# Patient Record
Sex: FEMALE | Race: WHITE | NOT HISPANIC OR LATINO | Employment: OTHER | URBAN - METROPOLITAN AREA
[De-identification: names, ages, dates, MRNs, and addresses within clinical notes are randomized per-mention and may not be internally consistent; named-entity substitution may affect disease eponyms.]

---

## 2018-08-06 ENCOUNTER — OFFICE VISIT (OUTPATIENT)
Dept: OTOLARYNGOLOGY | Facility: CLINIC | Age: 53
End: 2018-08-06
Payer: COMMERCIAL

## 2018-08-06 VITALS
SYSTOLIC BLOOD PRESSURE: 138 MMHG | TEMPERATURE: 98 F | HEART RATE: 65 BPM | WEIGHT: 182.13 LBS | DIASTOLIC BLOOD PRESSURE: 84 MMHG

## 2018-08-06 DIAGNOSIS — J32.9 CHRONIC SINUSITIS, UNSPECIFIED LOCATION: Primary | ICD-10-CM

## 2018-08-06 PROCEDURE — 99999 PR PBB SHADOW E&M-NEW PATIENT-LVL III: CPT | Mod: PBBFAC,,, | Performed by: ORTHOPAEDIC SURGERY

## 2018-08-06 PROCEDURE — 99204 OFFICE O/P NEW MOD 45 MIN: CPT | Mod: S$GLB,,, | Performed by: ORTHOPAEDIC SURGERY

## 2018-08-06 RX ORDER — LITHIUM CARBONATE 450 MG/1
450 TABLET ORAL 2 TIMES DAILY
COMMUNITY

## 2018-08-06 RX ORDER — QUETIAPINE FUMARATE 100 MG/1
100 TABLET, FILM COATED ORAL NIGHTLY
COMMUNITY

## 2018-08-06 RX ORDER — MELOXICAM 7.5 MG/1
7.5 TABLET ORAL DAILY PRN
COMMUNITY
Start: 2017-08-10

## 2018-08-06 NOTE — LETTER
August 6, 2018      Yessica Lubin MD  [address]  [facility] [address]           Critical access hospital Otorhinolaryngology  [address] [place] [id number]  Phone: [phone]  Fax: [phone]          Patient: Candice Schofield   MR Number: [id number]   YOB: 1965   Date of Visit: 8/6/2018       Dear Dr. Yessica Lubin:    Thank you for referring Candice Schofield to me for evaluation. Attached you will find relevant portions of my assessment and plan of care.    If you have questions, please do not hesitate to call me. I look forward to following Candice Schofield along with you.    Sincerely,    Jolly Haider MD    Enclosure  CC:  No Recipients    If you would like to receive this communication electronically, please contact [email or web address] or [phone] to request more information on [employer] [employer] access.    For providers and/or their staff who would like to refer a patient to [facility], please contact us through our one-stop-shop provider referral line, [facility], at [phone].    If you feel you have received this communication in error or would no longer like to receive these types of communications, please e-mail [email or web address]

## 2018-08-06 NOTE — LETTER
August 6, 2018        Yessica Chris MD  [address] [facility]  [place] [address]             [facility] Otorhinolaryngology  [address] [place] [id number]  Phone: [phone]  Fax: [phone]   Patient: Candice Schofield   MR Number: [id number]   YOB: 1965   Date of Visit: 8/6/2018       Dear Dr. Chris:    Thank you for referring Candice Schofield to me for evaluation. Attached you will find relevant portions of my assessment and plan of care.    If you have questions, please do not hesitate to call me. I look forward to following Candice Schofield along with you.    Sincerely,      Jolly Haider MD          CC  No Recipients    Enclosure

## 2018-08-06 NOTE — PROGRESS NOTES
Subjective:       Patient ID: Candice Schofield is a 53 y.o. female.    Chief Complaint: Consult and Sinus Problem    Patient is a very pleasant 53-year-old female here to see me today for the 1st time for evaluation of persistent sinusitis.  She reports that she has had issues with sinusitis previously in her lifetime, but never anything this severe.  Now, she has had symptoms for the last 2 months that have not resolved despite 3 courses of oral antibiotics as well as steroid injection and oral steroids.  She has been on Zithromax, Augmentin (had difficulty tolerating due to GI upset), as well as most recently a 2 week course of oral Omnicef which she was able to tolerate.  Her primary complaint is a sensation of pressure in her mid face, worse on the left than the right. She also does have nasal congestion, but has not been blowing her nose. She did have pain in her left upper teeth when this initially started, but that has slightly improved.  She has had 2 recent sinus x-rays with her primary care physician which she was told were abnormal, and did show sinusitis, though she does not have any copies of those with her today.  She does not smoke.  In general, she does not suffer with seasonal allergies, and has not required allergy medication in the past.      Review of Systems   Constitutional: Negative for chills, fatigue, fever and unexpected weight change.   HENT: Positive for congestion, facial swelling (Had noted some swelling over her left midface), rhinorrhea and sinus pressure. Negative for dental problem, ear discharge, ear pain, hearing loss, nosebleeds, postnasal drip, sneezing, sore throat, tinnitus, trouble swallowing and voice change.    Eyes: Negative for redness, itching and visual disturbance.   Respiratory: Negative for cough, choking, shortness of breath and wheezing.    Cardiovascular: Negative for chest pain and palpitations.   Gastrointestinal: Negative for abdominal pain.        No reflux.    Musculoskeletal: Negative for gait problem.   Skin: Negative for rash.   Neurological: Negative for dizziness, light-headedness and headaches.       Objective:      Physical Exam   Constitutional: She is oriented to person, place, and time. She appears well-developed and well-nourished. No distress.   HENT:   Head: Normocephalic and atraumatic.   Right Ear: Tympanic membrane, external ear and ear canal normal.   Left Ear: Tympanic membrane, external ear and ear canal normal.   Nose: Septal deviation (To the left) present. No mucosal edema, rhinorrhea or nasal deformity. No epistaxis. Right sinus exhibits no maxillary sinus tenderness and no frontal sinus tenderness. Left sinus exhibits maxillary sinus tenderness. Left sinus exhibits no frontal sinus tenderness.   Mouth/Throat: Uvula is midline, oropharynx is clear and moist and mucous membranes are normal. Mucous membranes are not pale and not dry. No dental caries. No oropharyngeal exudate or posterior oropharyngeal erythema.   Eyes: Conjunctivae, EOM and lids are normal. Pupils are equal, round, and reactive to light. Right eye exhibits no chemosis. Left eye exhibits no chemosis. Right conjunctiva is not injected. Left conjunctiva is not injected. No scleral icterus. Right eye exhibits normal extraocular motion and no nystagmus. Left eye exhibits normal extraocular motion and no nystagmus.   Neck: Trachea normal and phonation normal. No tracheal tenderness present. No tracheal deviation present. No thyroid mass and no thyromegaly present.   Cardiovascular: Intact distal pulses.    Pulmonary/Chest: Effort normal. No stridor. No respiratory distress.   Abdominal: She exhibits no distension.   Lymphadenopathy:        Head (right side): No submental, no submandibular, no preauricular, no posterior auricular and no occipital adenopathy present.        Head (left side): No submental, no submandibular, no preauricular, no posterior auricular and no occipital adenopathy  present.     She has no cervical adenopathy.   Neurological: She is alert and oriented to person, place, and time. No cranial nerve deficit.   Skin: Skin is warm and dry. No rash noted. No erythema.   Psychiatric: She has a normal mood and affect. Her behavior is normal.       Assessment:       1. Chronic sinusitis, unspecified location        Plan:       1.  Chronic sinusitis: At this point, the patient has been on multiple rounds of antibiotics including Augmentin, Azithromycin and Omnicef without significant or sustained improvement in their symptoms.  I would recommend a CT of the sinuses for further evaluation.  If the scan shows persistent sinus disease, she will then need a longer course of antibiotics, likely three to four weeks of Levaquin.  If the scan does not show persistent infection, the patient's symptoms are likely due to underlying allergies and would then maximize allergy therapy, possibly considering skin testing if needed.  The patient understands this treatment plan, and will call results when available.  We will also have her sign a release form today for us to obtain documentation of her previous sinus x-rays from her primary care physician.

## 2018-08-08 ENCOUNTER — HOSPITAL ENCOUNTER (OUTPATIENT)
Dept: RADIOLOGY | Facility: HOSPITAL | Age: 53
Discharge: HOME | End: 2018-08-08
Attending: ORTHOPAEDIC SURGERY
Payer: COMMERCIAL

## 2018-08-08 DIAGNOSIS — J32.9 CHRONIC SINUSITIS, UNSPECIFIED LOCATION: ICD-10-CM

## 2018-08-08 PROCEDURE — 70486 CT MAXILLOFACIAL W/O DYE: CPT | Mod: 26,,, | Performed by: RADIOLOGY

## 2018-08-08 PROCEDURE — 70486 CT MAXILLOFACIAL W/O DYE: CPT | Mod: TC,PO

## 2018-08-09 ENCOUNTER — TELEPHONE (OUTPATIENT)
Dept: OTOLARYNGOLOGY | Facility: CLINIC | Age: 53
End: 2018-08-09

## 2018-08-09 RX ORDER — CLINDAMYCIN HYDROCHLORIDE 300 MG/1
300 CAPSULE ORAL EVERY 8 HOURS
Qty: 30 CAPSULE | Refills: 0 | Status: SHIPPED | OUTPATIENT
Start: 2018-08-09 | End: 2018-08-19

## 2018-08-09 RX ORDER — AMOXICILLIN AND CLAVULANATE POTASSIUM 875; 125 MG/1; MG/1
1 TABLET, FILM COATED ORAL 2 TIMES DAILY
Qty: 20 TABLET | Refills: 0 | Status: SHIPPED | OUTPATIENT
Start: 2018-08-09 | End: 2018-08-09

## 2018-08-09 NOTE — TELEPHONE ENCOUNTER
Augmentin sent in to pharmacy.  Patient wants to consider oral surgeon and will call back with name of surgeon she decides to see (Dr. Barba or other).  Will place referral at that time.  She needs to bring copy of recent CT to that appt.

## 2018-08-09 NOTE — TELEPHONE ENCOUNTER
Left message for patient.  Her recent CT Sinus shows left maxillary sinus disease but it appears she has cyst around a tooth root and that's the main problem.  Dr. Haider has reviewed films with Dr. Barba (oral surgeon) who agrees.  Recommend starting oral antibiotics and referral to Dr. Barba or oral surgeon of her choice.  Please let me know who she decides to see. She needs to come and get copy of her CT to take to that appt.

## 2018-08-09 NOTE — TELEPHONE ENCOUNTER
Called patient and informed her of the CT results and that we need to send in a referral to an Oral surgeon of her choice I suggested Dr Barba and she said maybe she needs to think about it for a bit and she will get back with us to let us know where to send the referral. I also let her know that she needs to start oral ABTs and that bobby will call some into her pharmacy. Pt verbalized understanding.

## 2018-08-27 ENCOUNTER — TELEPHONE (OUTPATIENT)
Dept: OTOLARYNGOLOGY | Facility: CLINIC | Age: 53
End: 2018-08-27

## 2018-08-27 NOTE — TELEPHONE ENCOUNTER
----- Message from Narendra Moctezuma sent at 8/27/2018  9:45 AM CDT -----  Contact: pt   Pt requested a callback will explain during callback..[phone] (home)

## 2018-08-27 NOTE — TELEPHONE ENCOUNTER
Dr. Timur Mcmullen  Address: [address] [place] [address]  Phone: [phone]    I also recommend Dr. Mcmullen.  If she can give us the list of who is on her plan I am happy to look at that as well.

## 2018-08-27 NOTE — TELEPHONE ENCOUNTER
I conveyed the information below to the patients answer machine as she advised I do.  I asked that she call us back with any questions or with the list of her plans covered providers.

## 2018-08-27 NOTE — TELEPHONE ENCOUNTER
Patient states you saw her for cyst on the root of her tooth.  You recommended Dr. Obrien who is not on her health plan.  She called her health plan and there are only 3 providers on her list.  She can not remember their names.  She would like to know who your next suggestion would be.  Please advise.  If patient is not home, she states that we can leave the name of the provider on her answering machine.